# Patient Record
Sex: MALE | Race: OTHER | HISPANIC OR LATINO | URBAN - METROPOLITAN AREA
[De-identification: names, ages, dates, MRNs, and addresses within clinical notes are randomized per-mention and may not be internally consistent; named-entity substitution may affect disease eponyms.]

---

## 2022-10-15 ENCOUNTER — EMERGENCY (EMERGENCY)
Facility: HOSPITAL | Age: 77
LOS: 0 days | Discharge: HOME | End: 2022-10-15
Attending: STUDENT IN AN ORGANIZED HEALTH CARE EDUCATION/TRAINING PROGRAM | Admitting: STUDENT IN AN ORGANIZED HEALTH CARE EDUCATION/TRAINING PROGRAM
Payer: MEDICARE

## 2022-10-15 VITALS
RESPIRATION RATE: 18 BRPM | SYSTOLIC BLOOD PRESSURE: 138 MMHG | TEMPERATURE: 98 F | HEART RATE: 82 BPM | OXYGEN SATURATION: 98 % | DIASTOLIC BLOOD PRESSURE: 71 MMHG

## 2022-10-15 VITALS
SYSTOLIC BLOOD PRESSURE: 153 MMHG | HEART RATE: 106 BPM | OXYGEN SATURATION: 97 % | TEMPERATURE: 97 F | RESPIRATION RATE: 18 BRPM | DIASTOLIC BLOOD PRESSURE: 70 MMHG | WEIGHT: 145.06 LBS

## 2022-10-15 DIAGNOSIS — K59.00 CONSTIPATION, UNSPECIFIED: ICD-10-CM

## 2022-10-15 DIAGNOSIS — R10.9 UNSPECIFIED ABDOMINAL PAIN: ICD-10-CM

## 2022-10-15 DIAGNOSIS — R11.10 VOMITING, UNSPECIFIED: ICD-10-CM

## 2022-10-15 DIAGNOSIS — N20.1 CALCULUS OF URETER: ICD-10-CM

## 2022-10-15 LAB
ALBUMIN SERPL ELPH-MCNC: 4.5 G/DL — SIGNIFICANT CHANGE UP (ref 3.5–5.2)
ALP SERPL-CCNC: 77 U/L — SIGNIFICANT CHANGE UP (ref 30–115)
ALT FLD-CCNC: 18 U/L — SIGNIFICANT CHANGE UP (ref 0–41)
ANION GAP SERPL CALC-SCNC: 10 MMOL/L — SIGNIFICANT CHANGE UP (ref 7–14)
APPEARANCE UR: CLEAR — SIGNIFICANT CHANGE UP
AST SERPL-CCNC: 16 U/L — SIGNIFICANT CHANGE UP (ref 0–41)
BACTERIA # UR AUTO: NEGATIVE — SIGNIFICANT CHANGE UP
BASOPHILS # BLD AUTO: 0.04 K/UL — SIGNIFICANT CHANGE UP (ref 0–0.2)
BASOPHILS NFR BLD AUTO: 0.4 % — SIGNIFICANT CHANGE UP (ref 0–1)
BILIRUB SERPL-MCNC: 1.2 MG/DL — SIGNIFICANT CHANGE UP (ref 0.2–1.2)
BILIRUB UR-MCNC: NEGATIVE — SIGNIFICANT CHANGE UP
BUN SERPL-MCNC: 15 MG/DL — SIGNIFICANT CHANGE UP (ref 10–20)
CALCIUM SERPL-MCNC: 9.9 MG/DL — SIGNIFICANT CHANGE UP (ref 8.4–10.5)
CHLORIDE SERPL-SCNC: 98 MMOL/L — SIGNIFICANT CHANGE UP (ref 98–110)
CO2 SERPL-SCNC: 28 MMOL/L — SIGNIFICANT CHANGE UP (ref 17–32)
COLOR SPEC: SIGNIFICANT CHANGE UP
CREAT SERPL-MCNC: 0.8 MG/DL — SIGNIFICANT CHANGE UP (ref 0.7–1.5)
DIFF PNL FLD: ABNORMAL
EGFR: 92 ML/MIN/1.73M2 — SIGNIFICANT CHANGE UP
EOSINOPHIL # BLD AUTO: 0.04 K/UL — SIGNIFICANT CHANGE UP (ref 0–0.7)
EOSINOPHIL NFR BLD AUTO: 0.4 % — SIGNIFICANT CHANGE UP (ref 0–8)
EPI CELLS # UR: 3 /HPF — SIGNIFICANT CHANGE UP (ref 0–5)
GLUCOSE SERPL-MCNC: 314 MG/DL — HIGH (ref 70–99)
GLUCOSE UR QL: ABNORMAL
HCT VFR BLD CALC: 49.9 % — SIGNIFICANT CHANGE UP (ref 42–52)
HGB BLD-MCNC: 16.9 G/DL — SIGNIFICANT CHANGE UP (ref 14–18)
HYALINE CASTS # UR AUTO: 0 /LPF — SIGNIFICANT CHANGE UP (ref 0–7)
IMM GRANULOCYTES NFR BLD AUTO: 0.5 % — HIGH (ref 0.1–0.3)
KETONES UR-MCNC: NEGATIVE — SIGNIFICANT CHANGE UP
LACTATE SERPL-SCNC: 3 MMOL/L — HIGH (ref 0.7–2)
LEUKOCYTE ESTERASE UR-ACNC: NEGATIVE — SIGNIFICANT CHANGE UP
LIDOCAIN IGE QN: 22 U/L — SIGNIFICANT CHANGE UP (ref 7–60)
LYMPHOCYTES # BLD AUTO: 2.26 K/UL — SIGNIFICANT CHANGE UP (ref 1.2–3.4)
LYMPHOCYTES # BLD AUTO: 20.4 % — LOW (ref 20.5–51.1)
MCHC RBC-ENTMCNC: 30.6 PG — SIGNIFICANT CHANGE UP (ref 27–31)
MCHC RBC-ENTMCNC: 33.9 G/DL — SIGNIFICANT CHANGE UP (ref 32–37)
MCV RBC AUTO: 90.2 FL — SIGNIFICANT CHANGE UP (ref 80–94)
MONOCYTES # BLD AUTO: 0.95 K/UL — HIGH (ref 0.1–0.6)
MONOCYTES NFR BLD AUTO: 8.6 % — SIGNIFICANT CHANGE UP (ref 1.7–9.3)
NEUTROPHILS # BLD AUTO: 7.76 K/UL — HIGH (ref 1.4–6.5)
NEUTROPHILS NFR BLD AUTO: 69.7 % — SIGNIFICANT CHANGE UP (ref 42.2–75.2)
NITRITE UR-MCNC: NEGATIVE — SIGNIFICANT CHANGE UP
NRBC # BLD: 0 /100 WBCS — SIGNIFICANT CHANGE UP (ref 0–0)
PH UR: 6.5 — SIGNIFICANT CHANGE UP (ref 5–8)
PLATELET # BLD AUTO: 283 K/UL — SIGNIFICANT CHANGE UP (ref 130–400)
POTASSIUM SERPL-MCNC: 4.5 MMOL/L — SIGNIFICANT CHANGE UP (ref 3.5–5)
POTASSIUM SERPL-SCNC: 4.5 MMOL/L — SIGNIFICANT CHANGE UP (ref 3.5–5)
PROT SERPL-MCNC: 6.8 G/DL — SIGNIFICANT CHANGE UP (ref 6–8)
PROT UR-MCNC: ABNORMAL
RBC # BLD: 5.53 M/UL — SIGNIFICANT CHANGE UP (ref 4.7–6.1)
RBC # FLD: 12.3 % — SIGNIFICANT CHANGE UP (ref 11.5–14.5)
RBC CASTS # UR COMP ASSIST: 14 /HPF — HIGH (ref 0–4)
SODIUM SERPL-SCNC: 136 MMOL/L — SIGNIFICANT CHANGE UP (ref 135–146)
SP GR SPEC: >1.05 (ref 1.01–1.03)
UROBILINOGEN FLD QL: SIGNIFICANT CHANGE UP
WBC # BLD: 11.1 K/UL — HIGH (ref 4.8–10.8)
WBC # FLD AUTO: 11.1 K/UL — HIGH (ref 4.8–10.8)
WBC UR QL: 6 /HPF — HIGH (ref 0–5)

## 2022-10-15 PROCEDURE — 99284 EMERGENCY DEPT VISIT MOD MDM: CPT

## 2022-10-15 PROCEDURE — 74177 CT ABD & PELVIS W/CONTRAST: CPT | Mod: 26,MA

## 2022-10-15 PROCEDURE — 99285 EMERGENCY DEPT VISIT HI MDM: CPT

## 2022-10-15 RX ORDER — TAMSULOSIN HYDROCHLORIDE 0.4 MG/1
1 CAPSULE ORAL
Qty: 5 | Refills: 0
Start: 2022-10-15 | End: 2022-10-19

## 2022-10-15 RX ORDER — POLYETHYLENE GLYCOL 3350 17 G/17G
17 POWDER, FOR SOLUTION ORAL
Qty: 17 | Refills: 0
Start: 2022-10-15 | End: 2022-10-15

## 2022-10-15 NOTE — ED PROVIDER NOTE - PROGRESS NOTE DETAILS
Labs shows elevated glucose, but no gap. Pt is aware and will follow up with PCP. CT shows stool burden in colon, but no fecal impaction. Will send meds to pharmacy. CT also shows stone in L ureter. Cr is normal and UA shows no UTI. Pt will follow up with urology OP. Pt is stable for discharge.

## 2022-10-15 NOTE — ED PROVIDER NOTE - PATIENT PORTAL LINK FT
You can access the FollowMyHealth Patient Portal offered by [facility] by registering at the following website: [email or web address]. By joining [employer]’s FollowMyHealth portal, you will also be able to view your health information using other applications (apps) compatible with our system.

## 2022-10-15 NOTE — ED PROVIDER NOTE - OBJECTIVE STATEMENT
77-year-old male with no past medical history who presents with abdominal pain and constipation.  Reports that he has not had a bowel movement for the past 2 days and has tried a suppository, but has not provided him with much relief.  Also endorses abdominal pain since yesterday along with 2 episodes of vomiting.  Denies recent surgery.  Denies fever, shortness of breath, chest pain, urinary symptoms, and change in bowel movement.  Reports that he has not seen a primary care doctor for 5 years.

## 2022-10-15 NOTE — ED PROVIDER NOTE - NS ED ATTENDING STATEMENT MOD
This was a shared visit with the NOHEMY. I reviewed and verified the documentation and independently performed the documented:

## 2022-10-15 NOTE — ED PROVIDER NOTE - NSFOLLOWUPCLINICS_GEN_ALL_ED_FT
[facility]  Urology  .  [place]   Phone: [phone]  Fax:   Follow Up Time: 1-3 Days

## 2022-10-15 NOTE — ED PROVIDER NOTE - CLINICAL SUMMARY MEDICAL DECISION MAKING FREE TEXT BOX
.    76 y/o M no reported pmh p/w constipation and lower abd pain x2d. + flatus. No fever,  or urinary complaint. ROS PE above. Pt very well appearing. Labs reviewed. CT + L prox ureteral renal stone. UA no UTI. NL Cr.  IMP: renal stone, constipation.  Discussed all available results with Pt.  Pt understands results, plan of care, laxative use, outpt follow up w/  as discussed, and signs and symptoms for ED return.  Pt is comfortable with discharge. DC home.      .

## 2022-10-15 NOTE — ED ADULT NURSE NOTE - OBJECTIVE STATEMENT
Pt here complaining of constipation.  Pt denies pain at this time.  Pt states they took a suppository yesterday with no relief. Pt does not appear distressed at this time.  Abdomen soft, non tender.

## 2022-10-15 NOTE — ED PROVIDER NOTE - NS ED ROS FT
Constitutional: Negative for fever, chills, and fatigue.  HENT: Negative for headache  Cardiovascular: Negative for chest pain, and palpitation.  Respiratory: Negative for SOB, wheezing, cough and sputum production.  Gastrointestinal: + abd pain and constipation. Negative for nausea, vomiting, diarrhea, hematochezia, and melena.  Genitourinary: Negative for flank pain, dysuria, frequency, and hematuria.  Neurological: Negative for dizziness, syncope, focal weakness, numbness, and loss of consciousness.  Hematological: Does not bruise/bleed easily.

## 2022-10-15 NOTE — ED PROVIDER NOTE - PHYSICAL EXAMINATION
CONSTITUTIONAL: Well-appearing; in no apparent distress.   HEAD: Normocephalic; atraumatic.   EYES: Pupils are round and reactive, extra-ocular muscles are intact. Eyelids are normal in appearance without swelling or lesions.   ENT: Hearing is intact with good acuity to spoken voice. Patient is speaking clearly, not muffled and airway is intact.   RESPIRATORY: No signs of respiratory distress. Lung sounds are clear in all lobes bilaterally without rales, rhonchi, or wheezes.  CARDIOVASCULAR: Regular rate and rhythm.   GI: tender to palpation in general abd area. Abdomen is soft,  and without distention. Bowel sounds are present and normoactive in all four quadrants. No masses are noted.   NEURO: A & O x 3. Normal speech.   PSYCHOLOGICAL: Appropriate mood and affect. Good judgement and insight.

## 2023-08-15 PROBLEM — Z00.00 ENCOUNTER FOR PREVENTIVE HEALTH EXAMINATION: Status: ACTIVE | Noted: 2023-08-15

## 2023-08-18 ENCOUNTER — APPOINTMENT (OUTPATIENT)
Dept: SURGERY | Facility: CLINIC | Age: 78
End: 2023-08-18
Payer: MEDICARE

## 2023-08-18 VITALS
BODY MASS INDEX: 24.92 KG/M2 | WEIGHT: 146 LBS | HEIGHT: 64 IN | SYSTOLIC BLOOD PRESSURE: 130 MMHG | DIASTOLIC BLOOD PRESSURE: 70 MMHG | TEMPERATURE: 97.9 F | HEART RATE: 84 BPM | OXYGEN SATURATION: 98 %

## 2023-08-18 DIAGNOSIS — D17.0 BENIGN LIPOMATOUS NEOPLASM OF SKIN AND SUBCUTANEOUS TISSUE OF HEAD, FACE AND NECK: ICD-10-CM

## 2023-08-18 DIAGNOSIS — F17.200 NICOTINE DEPENDENCE, UNSPECIFIED, UNCOMPLICATED: ICD-10-CM

## 2023-08-18 PROCEDURE — 99202 OFFICE O/P NEW SF 15 MIN: CPT

## 2023-08-19 PROBLEM — F17.200 CURRENT SMOKER: Status: ACTIVE | Noted: 2023-08-18

## 2023-08-19 PROBLEM — D17.0 LIPOMA OF NECK: Status: ACTIVE | Noted: 2023-08-19

## 2023-08-19 NOTE — PHYSICAL EXAM
[Alert] : alert [Oriented to Person] : oriented to person [Oriented to Place] : oriented to place [Calm] : calm [de-identified] : no distress [de-identified] : large neck mobile mass over posterior neck [de-identified] : nonlabored breathing  [de-identified] : s1,s2

## 2023-08-19 NOTE — HISTORY OF PRESENT ILLNESS
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 It has been bothering him for a while but now he is ready to have it removed. He has not been to his primary doctor recently. He denies taking anticoagulation.

## 2023-08-25 ENCOUNTER — RESULT REVIEW (OUTPATIENT)
Age: 78
End: 2023-08-25

## 2023-08-25 ENCOUNTER — OUTPATIENT (OUTPATIENT)
Dept: OUTPATIENT SERVICES | Facility: HOSPITAL | Age: 78
LOS: 1 days | End: 2023-08-25
Payer: MEDICARE

## 2023-08-25 ENCOUNTER — NON-APPOINTMENT (OUTPATIENT)
Age: 78
End: 2023-08-25

## 2023-08-25 ENCOUNTER — TRANSCRIPTION ENCOUNTER (OUTPATIENT)
Age: 78
End: 2023-08-25

## 2023-08-25 VITALS
RESPIRATION RATE: 15 BRPM | WEIGHT: 146.17 LBS | DIASTOLIC BLOOD PRESSURE: 78 MMHG | SYSTOLIC BLOOD PRESSURE: 138 MMHG | HEIGHT: 65 IN | TEMPERATURE: 97 F | HEART RATE: 77 BPM | OXYGEN SATURATION: 99 %

## 2023-08-25 DIAGNOSIS — Z01.818 ENCOUNTER FOR OTHER PREPROCEDURAL EXAMINATION: ICD-10-CM

## 2023-08-25 DIAGNOSIS — Z90.49 ACQUIRED ABSENCE OF OTHER SPECIFIED PARTS OF DIGESTIVE TRACT: Chronic | ICD-10-CM

## 2023-08-25 DIAGNOSIS — R22.1 LOCALIZED SWELLING, MASS AND LUMP, NECK: ICD-10-CM

## 2023-08-25 LAB
A1C WITH ESTIMATED AVERAGE GLUCOSE RESULT: 9.2 % — HIGH (ref 4–5.6)
ALBUMIN SERPL ELPH-MCNC: 4.4 G/DL — SIGNIFICANT CHANGE UP (ref 3.5–5.2)
ALP SERPL-CCNC: 76 U/L — SIGNIFICANT CHANGE UP (ref 30–115)
ALT FLD-CCNC: 14 U/L — SIGNIFICANT CHANGE UP (ref 0–41)
ANION GAP SERPL CALC-SCNC: 12 MMOL/L — SIGNIFICANT CHANGE UP (ref 7–14)
APTT BLD: 34.9 SEC — SIGNIFICANT CHANGE UP (ref 27–39.2)
AST SERPL-CCNC: 11 U/L — SIGNIFICANT CHANGE UP (ref 0–41)
BASOPHILS # BLD AUTO: 0.07 K/UL — SIGNIFICANT CHANGE UP (ref 0–0.2)
BASOPHILS NFR BLD AUTO: 0.8 % — SIGNIFICANT CHANGE UP (ref 0–1)
BILIRUB SERPL-MCNC: 0.7 MG/DL — SIGNIFICANT CHANGE UP (ref 0.2–1.2)
BUN SERPL-MCNC: 17 MG/DL — SIGNIFICANT CHANGE UP (ref 10–20)
CALCIUM SERPL-MCNC: 9.6 MG/DL — SIGNIFICANT CHANGE UP (ref 8.4–10.5)
CHLORIDE SERPL-SCNC: 100 MMOL/L — SIGNIFICANT CHANGE UP (ref 98–110)
CO2 SERPL-SCNC: 25 MMOL/L — SIGNIFICANT CHANGE UP (ref 17–32)
CREAT SERPL-MCNC: 0.9 MG/DL — SIGNIFICANT CHANGE UP (ref 0.7–1.5)
EGFR: 89 ML/MIN/1.73M2 — SIGNIFICANT CHANGE UP
EOSINOPHIL # BLD AUTO: 0.12 K/UL — SIGNIFICANT CHANGE UP (ref 0–0.7)
EOSINOPHIL NFR BLD AUTO: 1.3 % — SIGNIFICANT CHANGE UP (ref 0–8)
ESTIMATED AVERAGE GLUCOSE: 217 MG/DL — HIGH (ref 68–114)
GLUCOSE SERPL-MCNC: 217 MG/DL — HIGH (ref 70–99)
HCT VFR BLD CALC: 46.9 % — SIGNIFICANT CHANGE UP (ref 42–52)
HGB BLD-MCNC: 15.8 G/DL — SIGNIFICANT CHANGE UP (ref 14–18)
IMM GRANULOCYTES NFR BLD AUTO: 0.3 % — SIGNIFICANT CHANGE UP (ref 0.1–0.3)
INR BLD: 0.92 RATIO — SIGNIFICANT CHANGE UP (ref 0.65–1.3)
LYMPHOCYTES # BLD AUTO: 2.73 K/UL — SIGNIFICANT CHANGE UP (ref 1.2–3.4)
LYMPHOCYTES # BLD AUTO: 29.5 % — SIGNIFICANT CHANGE UP (ref 20.5–51.1)
MCHC RBC-ENTMCNC: 30.7 PG — SIGNIFICANT CHANGE UP (ref 27–31)
MCHC RBC-ENTMCNC: 33.7 G/DL — SIGNIFICANT CHANGE UP (ref 32–37)
MCV RBC AUTO: 91.2 FL — SIGNIFICANT CHANGE UP (ref 80–94)
MONOCYTES # BLD AUTO: 0.72 K/UL — HIGH (ref 0.1–0.6)
MONOCYTES NFR BLD AUTO: 7.8 % — SIGNIFICANT CHANGE UP (ref 1.7–9.3)
NEUTROPHILS # BLD AUTO: 5.57 K/UL — SIGNIFICANT CHANGE UP (ref 1.4–6.5)
NEUTROPHILS NFR BLD AUTO: 60.3 % — SIGNIFICANT CHANGE UP (ref 42.2–75.2)
NRBC # BLD: 0 /100 WBCS — SIGNIFICANT CHANGE UP (ref 0–0)
PLATELET # BLD AUTO: 253 K/UL — SIGNIFICANT CHANGE UP (ref 130–400)
PMV BLD: 9.9 FL — SIGNIFICANT CHANGE UP (ref 7.4–10.4)
POTASSIUM SERPL-MCNC: 4.5 MMOL/L — SIGNIFICANT CHANGE UP (ref 3.5–5)
POTASSIUM SERPL-SCNC: 4.5 MMOL/L — SIGNIFICANT CHANGE UP (ref 3.5–5)
PROT SERPL-MCNC: 6.5 G/DL — SIGNIFICANT CHANGE UP (ref 6–8)
PROTHROM AB SERPL-ACNC: 10.5 SEC — SIGNIFICANT CHANGE UP (ref 9.95–12.87)
RBC # BLD: 5.14 M/UL — SIGNIFICANT CHANGE UP (ref 4.7–6.1)
RBC # FLD: 12.5 % — SIGNIFICANT CHANGE UP (ref 11.5–14.5)
SODIUM SERPL-SCNC: 137 MMOL/L — SIGNIFICANT CHANGE UP (ref 135–146)
WBC # BLD: 9.24 K/UL — SIGNIFICANT CHANGE UP (ref 4.8–10.8)
WBC # FLD AUTO: 9.24 K/UL — SIGNIFICANT CHANGE UP (ref 4.8–10.8)

## 2023-08-25 PROCEDURE — 99214 OFFICE O/P EST MOD 30 MIN: CPT | Mod: 25

## 2023-08-25 PROCEDURE — 93010 ELECTROCARDIOGRAM REPORT: CPT

## 2023-08-25 PROCEDURE — 36415 COLL VENOUS BLD VENIPUNCTURE: CPT

## 2023-08-25 PROCEDURE — 93005 ELECTROCARDIOGRAM TRACING: CPT

## 2023-08-25 PROCEDURE — 85610 PROTHROMBIN TIME: CPT

## 2023-08-25 PROCEDURE — 80053 COMPREHEN METABOLIC PANEL: CPT

## 2023-08-25 PROCEDURE — 85025 COMPLETE CBC W/AUTO DIFF WBC: CPT

## 2023-08-25 PROCEDURE — 71046 X-RAY EXAM CHEST 2 VIEWS: CPT

## 2023-08-25 PROCEDURE — 83036 HEMOGLOBIN GLYCOSYLATED A1C: CPT

## 2023-08-25 PROCEDURE — 85730 THROMBOPLASTIN TIME PARTIAL: CPT

## 2023-08-25 PROCEDURE — 71046 X-RAY EXAM CHEST 2 VIEWS: CPT | Mod: 26

## 2023-08-25 NOTE — H&P PST ADULT - REASON FOR ADMISSION
Proceduralist: Jelly Bell  Procedure: EXCISION OF NECK CYST  Procedure: 45 Minutes  PT STATES---I HAVE HAD A CYST ON MY NECK FOR A FEW YEARS.  IT HAS GROWN A LITTLE RECENTLY.  PT DENIES HAVING ANY PAIN.

## 2023-08-25 NOTE — H&P PST ADULT - ADDITIONAL PE
AIRWAY CLASS-3  PT HAS NO  TEETH.  NECK CYST NOTED- PLUM SIZE- NON TENDER NO DRAINAGE NOTED AIRWAY CLASS-3  PT HAS NO  TEETH.  NECK CYST NOTED- PLUM SIZE- NON TENDER NO DRAINAGE NOTED  PT STATES- I AM A BORDERLINE DIABETIC-MY DOCTOR TOLD ME I DO NOT NEED ANY MEDICATIONS.

## 2023-08-25 NOTE — H&P PST ADULT - WEIGHT IN LBS
Quality 110: Preventive Care And Screening: Influenza Immunization: Influenza Immunization Administered during Influenza season Detail Level: Detailed 146.1

## 2023-08-25 NOTE — H&P PST ADULT - NSANTHOSAYNRD_GEN_A_CORE
No. CHELA screening performed.  STOP BANG Legend: 0-2 = LOW Risk; 3-4 = INTERMEDIATE Risk; 5-8 = HIGH Risk

## 2023-08-26 DIAGNOSIS — Z01.818 ENCOUNTER FOR OTHER PREPROCEDURAL EXAMINATION: ICD-10-CM

## 2023-08-26 DIAGNOSIS — R22.1 LOCALIZED SWELLING, MASS AND LUMP, NECK: ICD-10-CM

## 2023-09-15 ENCOUNTER — APPOINTMENT (OUTPATIENT)
Dept: SURGERY | Facility: CLINIC | Age: 78
End: 2023-09-15